# Patient Record
Sex: MALE | Race: WHITE | NOT HISPANIC OR LATINO | Employment: FULL TIME | URBAN - METROPOLITAN AREA
[De-identification: names, ages, dates, MRNs, and addresses within clinical notes are randomized per-mention and may not be internally consistent; named-entity substitution may affect disease eponyms.]

---

## 2020-12-15 ENCOUNTER — TELEPHONE (OUTPATIENT)
Dept: SCHEDULING | Facility: IMAGING CENTER | Age: 25
End: 2020-12-15

## 2021-01-04 ENCOUNTER — OFFICE VISIT (OUTPATIENT)
Dept: MEDICAL GROUP | Facility: MEDICAL CENTER | Age: 26
End: 2021-01-04
Payer: COMMERCIAL

## 2021-01-04 VITALS
SYSTOLIC BLOOD PRESSURE: 134 MMHG | HEART RATE: 72 BPM | DIASTOLIC BLOOD PRESSURE: 88 MMHG | OXYGEN SATURATION: 99 % | TEMPERATURE: 98.1 F | WEIGHT: 187.39 LBS | BODY MASS INDEX: 28.4 KG/M2 | HEIGHT: 68 IN | RESPIRATION RATE: 16 BRPM

## 2021-01-04 DIAGNOSIS — Z13.29 THYROID DISORDER SCREEN: ICD-10-CM

## 2021-01-04 DIAGNOSIS — L05.91 PILONIDAL CYST: ICD-10-CM

## 2021-01-04 DIAGNOSIS — Z23 NEED FOR VACCINATION: ICD-10-CM

## 2021-01-04 DIAGNOSIS — Z13.21 ENCOUNTER FOR VITAMIN DEFICIENCY SCREENING: ICD-10-CM

## 2021-01-04 DIAGNOSIS — F51.01 PRIMARY INSOMNIA: ICD-10-CM

## 2021-01-04 DIAGNOSIS — Z86.16 HISTORY OF COVID-19: ICD-10-CM

## 2021-01-04 DIAGNOSIS — Z00.00 ANNUAL PHYSICAL EXAM: ICD-10-CM

## 2021-01-04 DIAGNOSIS — Z13.220 LIPID SCREENING: ICD-10-CM

## 2021-01-04 PROCEDURE — 90632 HEPA VACCINE ADULT IM: CPT | Performed by: NURSE PRACTITIONER

## 2021-01-04 PROCEDURE — 90471 IMMUNIZATION ADMIN: CPT | Performed by: NURSE PRACTITIONER

## 2021-01-04 PROCEDURE — 99385 PREV VISIT NEW AGE 18-39: CPT | Mod: 25 | Performed by: NURSE PRACTITIONER

## 2021-01-04 PROCEDURE — 90686 IIV4 VACC NO PRSV 0.5 ML IM: CPT | Performed by: NURSE PRACTITIONER

## 2021-01-04 PROCEDURE — 90472 IMMUNIZATION ADMIN EACH ADD: CPT | Performed by: NURSE PRACTITIONER

## 2021-01-04 PROCEDURE — 90715 TDAP VACCINE 7 YRS/> IM: CPT | Performed by: NURSE PRACTITIONER

## 2021-01-04 PROCEDURE — 99213 OFFICE O/P EST LOW 20 MIN: CPT | Mod: 25 | Performed by: NURSE PRACTITIONER

## 2021-01-04 NOTE — ASSESSMENT & PLAN NOTE
States that he has had sleep difficulty dating back to childhood, both problems with sleep induction as well as sleep maintenance.  He used to struggle more with anxiety which may have been a factor but states that this is been better in recent years.  He is exercising regularly but does not notice that this necessarily helps with his sleep.  He prefers not to take any medication but had tried an over-the-counter melatonin without much success  No feelings of depression, no panic attack

## 2021-01-04 NOTE — ASSESSMENT & PLAN NOTE
States that he developed a headache about 2 weeks ago which was very unusual for him-he never tends to get headaches.  He was tested for COVID-19 and came back positive.  Symptoms lasted for a few days and then have since resolved.  He never did develop cough, fever, shortness of breath.  He has had a follow-up test which was negative.

## 2021-01-04 NOTE — PROGRESS NOTES
Chief Complaint   Patient presents with   • Establish Care     NP   • Requesting Labs   • Bump     on tailbone, pt states getting bigger, pain when sitting     Joao Ulrich is a 25 y.o. male here to establish care and for well exam  HPI:   Primary insomnia  States that he has had sleep difficulty dating back to childhood, both problems with sleep induction as well as sleep maintenance.  He used to struggle more with anxiety which may have been a factor but states that this is been better in recent years.  He is exercising regularly but does not notice that this necessarily helps with his sleep.  He prefers not to take any medication but had tried an over-the-counter melatonin without much success  No feelings of depression, no panic attack    Pilonidal cyst  He reports small painful lump on the tailbone which has been present for approximately 4 months.  It seems to flareup for a few days at a time and then will subside.  It is causing him difficulty with exercise, pain when lying on his back to do sit ups or yoga.  He is not had any recent drainage, and is not inflamed at this time    History of COVID-19  States that he developed a headache about 2 weeks ago which was very unusual for him-he never tends to get headaches.  He was tested for COVID-19 and came back positive.  Symptoms lasted for a few days and then have since resolved.  He never did develop cough, fever, shortness of breath.  He has had a follow-up test which was negative.    Current medicines (including changes today)  No current outpatient medications on file.     No current facility-administered medications for this visit.      He  has no past medical history on file.  He  has a past surgical history that includes open reduction.  Social History     Tobacco Use   • Smoking status: Never Smoker   • Smokeless tobacco: Never Used   Substance Use Topics   • Alcohol use: Yes     Comment: occ   • Drug use: No     Social History     Social  "History Narrative   • Not on file     Family History   Problem Relation Age of Onset   • Diabetes Mother    • Diabetes Maternal Grandmother    • Parkinson's Disease Maternal Grandmother      Family Status   Relation Name Status   • Mo  Alive   • Fa  Alive   • MGMo           ROS  Problems listed discussed above, all other systems reviewed and negative     Objective:     /88 (BP Location: Left arm, Patient Position: Sitting, BP Cuff Size: Adult)   Pulse 72   Temp 36.7 °C (98.1 °F) (Temporal)   Resp 16   Ht 1.727 m (5' 8\")   Wt 85 kg (187 lb 6.3 oz)   SpO2 99%  Body mass index is 28.49 kg/m².  Physical Exam:  General: Alert, oriented in no acute distress.  Eye contact is good, speech is normal, affect calm  HEENT:  TMs gray with good landmarks bilaterally. No cervical or supraclavicular lymphadenopathy, thyroid isthmus palpable without masses or nodules.  Lungs: clear to auscultation bilaterally, good aeration, normal effort. No wheeze/ rhonchi/ rales.  CV: regular rate and rhythm, S1, S2. No murmur, no JVD, no edema. Pedal pulses 2 + bilaterally  Abdomen: soft, nontender, BS x4  Ext: Approximately 2 cm firm palpable cyst at the gluteal cleft, no significant erythema or drainage, tunneling present.  Color normal, vascularity normal, temperature normal. No rash or lesions.    Assessment and Plan:   The following treatment plan was discussed   1. Annual physical exam  Normal physical exam except as detailed below, concerns addressed. General health and wellness discussion including healthy diet, regular exercise. 2000 iu Vit d3 advised daily. Preventative health screenings as listed. Advised regular dental cleanings, eye exam yearly.    Lipid Profile    Comp Metabolic Panel    TSH WITH REFLEX TO FT4   2. Lipid screening  Lipid Profile   3. Thyroid disorder screen  TSH WITH REFLEX TO FT4   4. Encounter for vitamin deficiency screening  VITAMIN D,25 HYDROXY   5. Pilonidal cyst  REFERRAL TO GENERAL " SURGERY   6. Need for vaccination  I have placed the below orders and discussed them with an approved delegating provider. The MA is performing the below orders under the direction of Dr. Newman  Influenza Vaccine Quad Injection (PF)    Tdap =>6yo IM    Hep A Adult 19+   7. Primary insomnia   this is been a chronic issue, he is reluctant to try any medication.  Sleep hygiene reviewed.  Discussed safe over-the-counter options-should he choose he may try Unisom or ZzzQuil or higher dose of melatonin.   8. History of COVID-19   fully recovered at this time         Followup: Pending labs             Please note that this dictation was created using voice recognition software. I have worked with consultants from the vendor as well as technical experts from [employer] to optimize the interface. I have made every reasonable attempt to correct obvious errors, but I expect that there are errors of grammar and possibly content that I did not discover before finalizing the note.

## 2021-01-04 NOTE — ASSESSMENT & PLAN NOTE
He reports small painful lump on the tailbone which has been present for approximately 4 months.  It seems to flareup for a few days at a time and then will subside.  It is causing him difficulty with exercise, pain when lying on his back to do sit ups or yoga.  He is not had any recent drainage, and is not inflamed at this time

## 2021-01-07 ENCOUNTER — HOSPITAL ENCOUNTER (OUTPATIENT)
Dept: LAB | Facility: MEDICAL CENTER | Age: 26
End: 2021-01-07
Attending: NURSE PRACTITIONER
Payer: COMMERCIAL

## 2021-01-07 DIAGNOSIS — Z13.21 ENCOUNTER FOR VITAMIN DEFICIENCY SCREENING: ICD-10-CM

## 2021-01-07 DIAGNOSIS — Z00.00 ANNUAL PHYSICAL EXAM: ICD-10-CM

## 2021-01-07 DIAGNOSIS — Z13.29 THYROID DISORDER SCREEN: ICD-10-CM

## 2021-01-07 DIAGNOSIS — Z13.220 LIPID SCREENING: ICD-10-CM

## 2021-01-07 LAB
25(OH)D3 SERPL-MCNC: 21 NG/ML (ref 30–100)
ALBUMIN SERPL BCP-MCNC: 4.7 G/DL (ref 3.2–4.9)
ALBUMIN/GLOB SERPL: 2 G/DL
ALP SERPL-CCNC: 78 U/L (ref 30–99)
ALT SERPL-CCNC: 49 U/L (ref 2–50)
ANION GAP SERPL CALC-SCNC: 10 MMOL/L (ref 7–16)
AST SERPL-CCNC: 23 U/L (ref 12–45)
BILIRUB SERPL-MCNC: 0.6 MG/DL (ref 0.1–1.5)
BUN SERPL-MCNC: 14 MG/DL (ref 8–22)
CALCIUM SERPL-MCNC: 9.6 MG/DL (ref 8.4–10.2)
CHLORIDE SERPL-SCNC: 100 MMOL/L (ref 96–112)
CHOLEST SERPL-MCNC: 202 MG/DL (ref 100–199)
CO2 SERPL-SCNC: 26 MMOL/L (ref 20–33)
CREAT SERPL-MCNC: 0.98 MG/DL (ref 0.5–1.4)
FASTING STATUS PATIENT QL REPORTED: NORMAL
GLOBULIN SER CALC-MCNC: 2.3 G/DL (ref 1.9–3.5)
GLUCOSE SERPL-MCNC: 100 MG/DL (ref 65–99)
HDLC SERPL-MCNC: 47 MG/DL
LDLC SERPL CALC-MCNC: 138 MG/DL
POTASSIUM SERPL-SCNC: 4.3 MMOL/L (ref 3.6–5.5)
PROT SERPL-MCNC: 7 G/DL (ref 6–8.2)
SODIUM SERPL-SCNC: 136 MMOL/L (ref 135–145)
TRIGL SERPL-MCNC: 85 MG/DL (ref 0–149)
TSH SERPL DL<=0.005 MIU/L-ACNC: 1.69 UIU/ML (ref 0.38–5.33)

## 2021-01-07 PROCEDURE — 80061 LIPID PANEL: CPT

## 2021-01-07 PROCEDURE — 84443 ASSAY THYROID STIM HORMONE: CPT

## 2021-01-07 PROCEDURE — 36415 COLL VENOUS BLD VENIPUNCTURE: CPT

## 2021-01-07 PROCEDURE — 82306 VITAMIN D 25 HYDROXY: CPT

## 2021-01-07 PROCEDURE — 80053 COMPREHEN METABOLIC PANEL: CPT

## 2021-02-08 ENCOUNTER — HOSPITAL ENCOUNTER (OUTPATIENT)
Facility: MEDICAL CENTER | Age: 26
End: 2021-02-08
Attending: FAMILY MEDICINE
Payer: COMMERCIAL

## 2021-02-08 ENCOUNTER — OFFICE VISIT (OUTPATIENT)
Dept: URGENT CARE | Facility: CLINIC | Age: 26
End: 2021-02-08
Payer: COMMERCIAL

## 2021-02-08 VITALS
DIASTOLIC BLOOD PRESSURE: 84 MMHG | HEIGHT: 68 IN | SYSTOLIC BLOOD PRESSURE: 120 MMHG | OXYGEN SATURATION: 97 % | WEIGHT: 185.2 LBS | BODY MASS INDEX: 28.07 KG/M2 | HEART RATE: 72 BPM | TEMPERATURE: 98.5 F | RESPIRATION RATE: 18 BRPM

## 2021-02-08 DIAGNOSIS — N50.811 RIGHT TESTICULAR PAIN: ICD-10-CM

## 2021-02-08 DIAGNOSIS — R10.31 RIGHT GROIN PAIN: ICD-10-CM

## 2021-02-08 LAB
APPEARANCE UR: CLEAR
BILIRUB UR STRIP-MCNC: NEGATIVE MG/DL
COLOR UR AUTO: YELLOW
GLUCOSE UR STRIP.AUTO-MCNC: NEGATIVE MG/DL
KETONES UR STRIP.AUTO-MCNC: NEGATIVE MG/DL
LEUKOCYTE ESTERASE UR QL STRIP.AUTO: NEGATIVE
NITRITE UR QL STRIP.AUTO: NEGATIVE
PH UR STRIP.AUTO: 7 [PH] (ref 5–8)
PROT UR QL STRIP: NEGATIVE MG/DL
RBC UR QL AUTO: NEGATIVE
SP GR UR STRIP.AUTO: >=1.03
UROBILINOGEN UR STRIP-MCNC: 0.2 MG/DL

## 2021-02-08 PROCEDURE — 87591 N.GONORRHOEAE DNA AMP PROB: CPT

## 2021-02-08 PROCEDURE — 87491 CHLMYD TRACH DNA AMP PROBE: CPT

## 2021-02-08 PROCEDURE — 81002 URINALYSIS NONAUTO W/O SCOPE: CPT | Performed by: FAMILY MEDICINE

## 2021-02-08 PROCEDURE — 99213 OFFICE O/P EST LOW 20 MIN: CPT | Performed by: FAMILY MEDICINE

## 2021-02-08 ASSESSMENT — ENCOUNTER SYMPTOMS
DIZZINESS: 0
FEVER: 0
MYALGIAS: 0
AFFECTED TESTICLE: 1
CHILLS: 0
COUGH: 0
NAUSEA: 0
SORE THROAT: 0
SHORTNESS OF BREATH: 0
VOMITING: 0

## 2021-02-08 ASSESSMENT — PAIN SCALES - GENERAL: PAINLEVEL: 3=SLIGHT PAIN

## 2021-02-09 ENCOUNTER — TELEPHONE (OUTPATIENT)
Dept: URGENT CARE | Facility: PHYSICIAN GROUP | Age: 26
End: 2021-02-09

## 2021-02-09 ENCOUNTER — HOSPITAL ENCOUNTER (OUTPATIENT)
Dept: RADIOLOGY | Facility: MEDICAL CENTER | Age: 26
End: 2021-02-09
Attending: FAMILY MEDICINE
Payer: COMMERCIAL

## 2021-02-09 DIAGNOSIS — N50.811 RIGHT TESTICULAR PAIN: ICD-10-CM

## 2021-02-09 DIAGNOSIS — R10.31 RIGHT GROIN PAIN: ICD-10-CM

## 2021-02-09 LAB
C TRACH DNA SPEC QL NAA+PROBE: NEGATIVE
N GONORRHOEA DNA SPEC QL NAA+PROBE: NEGATIVE
SPECIMEN SOURCE: NORMAL

## 2021-02-09 PROCEDURE — 76870 US EXAM SCROTUM: CPT

## 2021-02-09 NOTE — PATIENT INSTRUCTIONS
Epididymitis    Epididymitis is swelling (inflammation) or infection of the epididymis. The epididymis is a cord-like structure that is located along the top and back part of the testicle. It collects and stores sperm from the testicle.  This condition can also cause pain and swelling of the testicle and scrotum. Symptoms usually start suddenly (acute epididymitis). Sometimes epididymitis starts gradually and lasts for a while (chronic epididymitis). This type may be harder to treat.  What are the causes?  In men ages 20-40, this condition is usually caused by a bacterial infection or a sexually transmitted disease (STD), such as:  · Gonorrhea.  · Chlamydia.  In men 40 and older who do not have anal sex, this condition is usually caused by bacteria from a blockage or from abnormalities in the urinary system. These can result from:  · Having a tube placed into the bladder (urinary catheter).  · Having an enlarged or inflamed prostate gland.  · Having recently had urinary tract surgery.  · Having a problem with a backward flow of urine (retrograde).  In men who have a condition that weakens the body's defense system (immune system), such as HIV, this condition can be caused by:  · Other bacteria, including tuberculosis and syphilis.  · Viruses.  · Fungi.  Sometimes this condition occurs without infection. This may happen because of trauma or repetitive activities such as sports.  What increases the risk?  You are more likely to develop this condition if you have:  · Unprotected sex with more than one partner.  · Anal sex.  · Recently had surgery.  · A urinary catheter.  · Urinary problems.  · A suppressed immune system.  What are the signs or symptoms?  This condition usually begins suddenly with chills, fever, and pain behind the scrotum and in the testicle. Other symptoms include:  · Swelling of the scrotum, testicle, or both.  · Pain when ejaculating or urinating.  · Pain in the back or  abdomen.  · Nausea.  · Itching and discharge from the penis.  · A frequent need to pass urine.  · Redness, increased warmth, and tenderness of the scrotum.  How is this diagnosed?  Your health care provider can diagnose this condition based on your symptoms and medical history. Your health care provider will also do a physical exam to ask about your symptoms and check your scrotum and testicle for swelling, pain, and redness. You may also have other tests, including:  · Examination of discharge from the penis.  · Urine tests for infections, such as STDs.  · Ultrasound test for blood flow and inflammation.  Your health care provider may test you for other STDs, including HIV.  How is this treated?  Treatment for this condition depends on the cause. If your condition is caused by a bacterial infection, oral antibiotic medicine may be prescribed. If the bacterial infection has spread to your blood, you may need to receive IV antibiotics.  For both bacterial and nonbacterial epididymitis, you may be treated with:  · Rest.  · Elevation of the scrotum.  · Pain medicines.  · Anti-inflammatory medicines.  Surgery may be needed to treat:  · Bacterial epididymitis that causes pus to build up in the scrotum (abscess).  · Chronic epididymitis that has not responded to other treatments.  Follow these instructions at home:  Medicines  · Take over-the-counter and prescription medicines only as told by your health care provider.  · If you were prescribed an antibiotic medicine, take it as told by your health care provider. Do not stop taking the antibiotic even if your condition improves.  Sexual activity  · If your epididymitis was caused by an STD, avoid sexual activity until your treatment is complete.  · Inform your sexual partner or partners if you test positive for an STD. They may need to be treated. Do not engage in sexual activity with your partner or partners until their treatment is completed.  Managing pain and  swelling    · If directed, elevate your scrotum and apply ice.  ? Put ice in a plastic bag.  ? Place a small towel or pillow between your legs.  ? Rest your scrotum on the pillow or towel.  ? Place another towel between your skin and the plastic bag.  ? Leave the ice on for 20 minutes, 2-3 times a day.  · Try taking a sitz bath to help with discomfort. This is a warm water bath that is taken while you are sitting down. The water should only come up to your hips and should cover your buttocks. Do this 3-4 times per day or as told by your health care provider.  · Keep your scrotum elevated and supported while resting. Ask your health care provider if you should wear a scrotal support, such as a jockstrap. Wear it as told by your health care provider.  General instructions  · Return to your normal activities as told by your health care provider. Ask your health care provider what activities are safe for you.  · Drink enough fluid to keep your urine pale yellow.  · Keep all follow-up visits as told by your health care provider. This is important.  Contact a health care provider if:  · You have a fever.  · Your pain medicine is not helping.  · Your pain is getting worse.  · Your symptoms do not improve within 3 days.  Summary  · Epididymitis is swelling (inflammation) or infection of the epididymis. This condition can also cause pain and swelling of the testicle and scrotum.  · Treatment for this condition depends on the cause. If your condition is caused by a bacterial infection, oral antibiotic medicine may be prescribed.  · Inform your sexual partner or partners if you test positive for an STD. They may need to be treated. Do not engage in sexual activity with your partner or partners until their treatment is completed.  · Contact a health care provider if your symptoms do not improve within 3 days.  This information is not intended to replace advice given to you by your health care provider. Make sure you discuss any  questions you have with your health care provider.  Document Released: 12/15/2001 Document Revised: 10/21/2019 Document Reviewed: 10/22/2019  [employer] Patient Education © 2020 [employer]  Varicocele    A varicocele is a swelling of veins in the scrotum. The scrotum is the sac that contains the testicles. Varicoceles can occur on either side of the scrotum, but they are more common on the left side. They occur most often in teenage boys and young men.  In most cases, varicoceles are not a serious problem. They are usually small and painless and do not require treatment. Tests may be done to confirm the diagnosis. Treatment may be needed if:  · A varicocele is large, causes a lot of pain, or causes pain when exercising.  · Varicoceles are found on both sides of the scrotum.  · A varicocele causes a decrease in the size of the testicle in a growing adolescent.  · The person has fertility problems.  What are the causes?  This condition is the result of valves in the veins not working properly. Valves in the veins help to return blood from the scrotum and testicles to the heart. If these valves do not work well, blood flows backward and backs up into the veins, which causes the veins to swell. This is similar to what happens when varicose veins form in the leg.  What are the signs or symptoms?  Most varicoceles do not cause any symptoms. If symptoms do occur, they may include:  · Swelling on one side of the scrotum. The swelling may be more obvious when you are standing up.  · A lumpy feeling in the scrotum.  · A heavy feeling on one side of the scrotum.  · A dull ache in the scrotum, especially after exercise or prolonged standing or sitting.  · Slower growth or reduced size of the testicle on the side of the varicocele (in young males).  · Problems with fathering a child (fertility). This can occur if the testicle does not grow normally or if the condition causes problems with the sperm, such as a low sperm count or  sperm that are not able to reach the egg (poor motility).  How is this diagnosed?  This condition is diagnosed based on:  · Your medical history.  · A physical exam. Your health care provider may inspect and feel (palpate) the scrotal area to check for swollen or enlarged veins.  · An ultrasound. This may be done to confirm the diagnosis and to help rule out other causes of the swelling.  How is this treated?  Treatment is usually not needed for this condition. If you have any pain, your health care provider may prescribe or recommend medicine to help relieve it. You may need regular exams so your health care provider can monitor the varicocele to ensure that it does not cause problems.  When further treatment is needed, it may involve one of these options:  · Varicocelectomy. This is a surgery in which the swollen veins are tied off so that the flow of blood goes to other veins instead.  · Embolization. In this procedure, a small, thin tube (catheter) is used to place metal coils or other blocking items in the veins. This cuts off the blood flow to the swollen veins.  Follow these instructions at home:  · Take over-the-counter and prescription medicines only as told by your health care provider.  · Wear supportive underwear.  · Use an athletic supporter when participating in sports activities.  · Keep all follow-up visits as told by your health care provider. This is important.  Contact a health care provider if:  · Your pain is increasing.  · You have redness in the affected area.  · Your testicle becomes enlarged, swollen, or painful.  · You have swelling that does not decrease when you are lying down.  · One of your testicles is smaller than the other.  Get help right away if:  · You develop swelling in your legs.  · You have difficulty breathing.  Summary  · Varicocele is a condition in which the veins in the scrotum are swollen or enlarged.  · In most cases, varicoceles do not require treatment.  · Treatment  may be needed if you have pain, have problems with infertility, or have a smaller testicle associated with the varicocele.  · In some cases, the condition may be treated with a procedure to cut off the flow of blood to the swollen veins.  This information is not intended to replace advice given to you by your health care provider. Make sure you discuss any questions you have with your health care provider.  Document Released: 03/26/2002 Document Revised: 03/06/2020 Document Reviewed: 03/06/2020  Elsevier Patient Education © 2020 [employer]

## 2021-02-09 NOTE — PROGRESS NOTES
"Subjective:   Joao Ulrich is a 25 y.o. male who presents for Testicle Pain (right x1week)        Testicle Pain  This is a new problem. Episode onset: 1 week, gradual onset right groin and testicle pain, described as right testicle \"fullness\" worse this morning. The problem has been waxing and waning (worse this morning, denies pain at this time). Pertinent negatives include no chills, coughing, fever, myalgias, nausea, rash, sore throat or vomiting. Associated symptoms comments: Pain worse with walking, resolves at rest    Reports unprotected intercourse with single partner within lat 6 months, denies penile discharge, denies dysuria    Denies traumatic injury or recent increase in activity. The symptoms are aggravated by walking. He has tried rest for the symptoms. The treatment provided mild relief.     PMH:  has no past medical history on file.  MEDS: No current outpatient medications on file.  ALLERGIES: No Known Allergies  SURGHX:   Past Surgical History:   Procedure Laterality Date   • OPEN REDUCTION      L tibia repair     SOCHX:  reports that he has never smoked. He has never used smokeless tobacco. He reports current alcohol use. He reports that he does not use drugs.  FH:   Family History   Problem Relation Age of Onset   • Diabetes Mother    • Diabetes Maternal Grandmother    • Parkinson's Disease Maternal Grandmother      Review of Systems   Constitutional: Negative for chills and fever.   HENT: Negative for sore throat.    Respiratory: Negative for cough and shortness of breath.    Gastrointestinal: Negative for nausea and vomiting.   Genitourinary: Negative for dysuria, frequency and hematuria.   Musculoskeletal: Negative for myalgias.   Skin: Negative for rash.   Neurological: Negative for dizziness.        Objective:   /84 (BP Location: Left arm, Patient Position: Sitting)   Pulse 72   Temp 36.9 °C (98.5 °F) (Tympanic)   Resp 18   Ht 1.727 m (5' 8\")   Wt 84 kg (185 lb 3.2 oz)  "  SpO2 97%   BMI 28.16 kg/m²   Physical Exam  Vitals signs and nursing note reviewed.   Constitutional:       General: He is not in acute distress.     Appearance: He is well-developed.   HENT:      Head: Normocephalic and atraumatic.      Right Ear: External ear normal.      Left Ear: External ear normal.      Nose: Nose normal.      Mouth/Throat:      Mouth: Mucous membranes are moist.   Eyes:      Conjunctiva/sclera: Conjunctivae normal.   Cardiovascular:      Rate and Rhythm: Normal rate.   Pulmonary:      Effort: Pulmonary effort is normal. No respiratory distress.      Breath sounds: Normal breath sounds.   Abdominal:      General: There is no distension.      Hernia: There is no hernia in the left inguinal area or right inguinal area.   Genitourinary:     Testes:         Right: Swelling and varicocele present. Tenderness or testicular hydrocele not present.         Left: Tenderness, swelling, testicular hydrocele or varicocele not present.      Epididymis:      Right: Enlarged. Not inflamed. No mass or tenderness.      Left: Not inflamed or enlarged. No mass or tenderness.   Musculoskeletal: Normal range of motion.   Skin:     General: Skin is warm and dry.   Neurological:      General: No focal deficit present.      Mental Status: He is alert and oriented to person, place, and time. Mental status is at baseline.      Gait: Gait (gait at baseline) normal.   Psychiatric:         Judgment: Judgment normal.     [id number]  Order: [id number]  Status:  Final result   Visible to patient:  No (not released) Dx:  Right groin pain; Right testicular pain  Details    Reading Physician Reading Date Result Priority   Nathaniel Cardozo M.D.  [phone] 2/9/2021 STAT      Narrative & Impression        2/9/2021 4:33 PM     HISTORY/REASON FOR EXAM: Right-sided testicular pain.     TECHNIQUE/EXAM DESCRIPTION:  Real-time sonography of the scrotum was performed with gray-scale, color and duplex Doppler  imaging.     COMPARISON: None     FINDINGS:  The testes are homogeneous in echotexture and low-resistive waveforms are confirmed bilaterally. No intratesticular mass is seen. Vascular flow is symmetric.     The right testis measures 5.03 cm x 2.15 cm x 3.27 cm.  The left testis measures  4.81 cm x 2.16 cm x 3.19 cm.     Appearance of the epididymides are within normal limits.  Several small 4 and 5 mm simple cysts are present in the right epididymal head.     No abnormal volume hydrocele is detected.     No varicocele is detected.  There is a small reducible indirect left inguinal hernia present with the hernia sac measuring 1.4 cm in maximum length. No bowel is identified within the hernia.     IMPRESSION:     1.  No testicular mass or evidence of testicular torsion.     2.  No evidence of epididymitis.     3.  Small left indirect reducible inguinal hernia containing fat is incidentally noted.             Last Resulted: 02/09/21  5:39 PM                 Assessment/Plan:   1. Right testicular pain  - POCT Urinalysis  - CHLAMYDIA/GC PCR URINE OR SWAB; Future  - [id number]; Future    2. Right groin pain  - [id number]; Future        Medical Decision Making/Course:  In the course of preparing for this visit with review of the pertinent past medical history, recent and past clinic visits, current medications, and in the further course of obtaining the current history pertinent to the clinic visit today, performing an exam and evaluation, ordering and independently evaluating labs, tests, and/or procedures, prescribing any recommended new medications including a discussion on the patient request to avoid initiation of pain medication at this time as the right testicular pain is not limiting function nor particularly bothersome during the urgent care visit, and providing any pertinent counseling and education and recommending further coordination of care, at least 30 minutes of total time were spent  during this encounter.      Discussed close monitoring, return precautions, and supportive measures of maintaining adequate fluid hydration and caloric intake, relative rest and symptom management as needed for pain and/or fever.    Differential diagnosis, natural history, supportive care, and indications for immediate follow-up discussed.     Advised the patient to follow-up with the primary care physician for recheck, reevaluation, and consideration of further management.    Please note that this dictation was created using voice recognition software. I have worked with consultants from the vendor as well as technical experts from [employer] to optimize the interface. I have made every reasonable attempt to correct obvious errors, but I expect that there are errors of grammar and possibly content that I did not discover before finalizing the note.

## 2021-02-10 NOTE — RESULT ENCOUNTER NOTE
Please call patient and let them know the laboratory testing for chlamydia and gonorrhea were both negative.  Thank you

## 2021-02-10 NOTE — TELEPHONE ENCOUNTER
Called patient and after identification confirmation gave results of normal scrotal and testicular ultrasound.  Advised over-the-counter ibuprofen every 8 hours as needed for pain and follow-up with primary care provider for any persistent or worsening symptoms for consideration of urology consultation and/or return to the urgent care for any persistent or worsening symptoms for consideration of further evaluation management.

## 2021-03-27 ENCOUNTER — APPOINTMENT (OUTPATIENT)
Dept: RADIOLOGY | Facility: MEDICAL CENTER | Age: 26
End: 2021-03-27
Attending: EMERGENCY MEDICINE
Payer: COMMERCIAL

## 2021-03-27 ENCOUNTER — HOSPITAL ENCOUNTER (EMERGENCY)
Facility: MEDICAL CENTER | Age: 26
End: 2021-03-27
Attending: EMERGENCY MEDICINE
Payer: COMMERCIAL

## 2021-03-27 VITALS
TEMPERATURE: 98.1 F | SYSTOLIC BLOOD PRESSURE: 116 MMHG | DIASTOLIC BLOOD PRESSURE: 75 MMHG | RESPIRATION RATE: 16 BRPM | OXYGEN SATURATION: 98 % | WEIGHT: 182.54 LBS | HEIGHT: 68 IN | HEART RATE: 61 BPM | BODY MASS INDEX: 27.67 KG/M2

## 2021-03-27 DIAGNOSIS — R10.13 EPIGASTRIC PAIN: ICD-10-CM

## 2021-03-27 LAB
ALBUMIN SERPL BCP-MCNC: 4.9 G/DL (ref 3.2–4.9)
ALBUMIN/GLOB SERPL: 2 G/DL
ALP SERPL-CCNC: 76 U/L (ref 30–99)
ALT SERPL-CCNC: 31 U/L (ref 2–50)
ANION GAP SERPL CALC-SCNC: 11 MMOL/L (ref 7–16)
APPEARANCE UR: ABNORMAL
AST SERPL-CCNC: 17 U/L (ref 12–45)
BACTERIA #/AREA URNS HPF: NEGATIVE /HPF
BASOPHILS # BLD AUTO: 0.5 % (ref 0–1.8)
BASOPHILS # BLD: 0.04 K/UL (ref 0–0.12)
BILIRUB SERPL-MCNC: 0.6 MG/DL (ref 0.1–1.5)
BILIRUB UR QL STRIP.AUTO: NEGATIVE
BUN SERPL-MCNC: 12 MG/DL (ref 8–22)
CALCIUM SERPL-MCNC: 9.3 MG/DL (ref 8.5–10.5)
CHLORIDE SERPL-SCNC: 102 MMOL/L (ref 96–112)
CO2 SERPL-SCNC: 24 MMOL/L (ref 20–33)
COLOR UR: YELLOW
CREAT SERPL-MCNC: 0.8 MG/DL (ref 0.5–1.4)
EOSINOPHIL # BLD AUTO: 0.76 K/UL (ref 0–0.51)
EOSINOPHIL NFR BLD: 10.1 % (ref 0–6.9)
EPI CELLS #/AREA URNS HPF: NEGATIVE /HPF
ERYTHROCYTE [DISTWIDTH] IN BLOOD BY AUTOMATED COUNT: 37.3 FL (ref 35.9–50)
GLOBULIN SER CALC-MCNC: 2.5 G/DL (ref 1.9–3.5)
GLUCOSE SERPL-MCNC: 92 MG/DL (ref 65–99)
GLUCOSE UR STRIP.AUTO-MCNC: NEGATIVE MG/DL
HCT VFR BLD AUTO: 44.4 % (ref 42–52)
HGB BLD-MCNC: 15.5 G/DL (ref 14–18)
HYALINE CASTS #/AREA URNS LPF: ABNORMAL /LPF
IMM GRANULOCYTES # BLD AUTO: 0.02 K/UL (ref 0–0.11)
IMM GRANULOCYTES NFR BLD AUTO: 0.3 % (ref 0–0.9)
KETONES UR STRIP.AUTO-MCNC: NEGATIVE MG/DL
LEUKOCYTE ESTERASE UR QL STRIP.AUTO: NEGATIVE
LIPASE SERPL-CCNC: 31 U/L (ref 11–82)
LYMPHOCYTES # BLD AUTO: 2.92 K/UL (ref 1–4.8)
LYMPHOCYTES NFR BLD: 38.6 % (ref 22–41)
MCH RBC QN AUTO: 29.4 PG (ref 27–33)
MCHC RBC AUTO-ENTMCNC: 34.9 G/DL (ref 33.7–35.3)
MCV RBC AUTO: 84.3 FL (ref 81.4–97.8)
MICRO URNS: ABNORMAL
MONOCYTES # BLD AUTO: 0.45 K/UL (ref 0–0.85)
MONOCYTES NFR BLD AUTO: 6 % (ref 0–13.4)
NEUTROPHILS # BLD AUTO: 3.37 K/UL (ref 1.82–7.42)
NEUTROPHILS NFR BLD: 44.5 % (ref 44–72)
NITRITE UR QL STRIP.AUTO: NEGATIVE
NRBC # BLD AUTO: 0 K/UL
NRBC BLD-RTO: 0 /100 WBC
PH UR STRIP.AUTO: 7 [PH] (ref 5–8)
PLATELET # BLD AUTO: 246 K/UL (ref 164–446)
PMV BLD AUTO: 10.3 FL (ref 9–12.9)
POTASSIUM SERPL-SCNC: 4 MMOL/L (ref 3.6–5.5)
PROT SERPL-MCNC: 7.4 G/DL (ref 6–8.2)
PROT UR QL STRIP: NEGATIVE MG/DL
RBC # BLD AUTO: 5.27 M/UL (ref 4.7–6.1)
RBC # URNS HPF: ABNORMAL /HPF
RBC UR QL AUTO: NEGATIVE
SODIUM SERPL-SCNC: 137 MMOL/L (ref 135–145)
SP GR UR STRIP.AUTO: 1.02
UROBILINOGEN UR STRIP.AUTO-MCNC: 0.2 MG/DL
WBC # BLD AUTO: 7.6 K/UL (ref 4.8–10.8)
WBC #/AREA URNS HPF: ABNORMAL /HPF

## 2021-03-27 PROCEDURE — 36415 COLL VENOUS BLD VENIPUNCTURE: CPT

## 2021-03-27 PROCEDURE — 83690 ASSAY OF LIPASE: CPT

## 2021-03-27 PROCEDURE — 80053 COMPREHEN METABOLIC PANEL: CPT

## 2021-03-27 PROCEDURE — 76705 ECHO EXAM OF ABDOMEN: CPT

## 2021-03-27 PROCEDURE — 81001 URINALYSIS AUTO W/SCOPE: CPT

## 2021-03-27 PROCEDURE — 85025 COMPLETE CBC W/AUTO DIFF WBC: CPT

## 2021-03-27 PROCEDURE — 99284 EMERGENCY DEPT VISIT MOD MDM: CPT

## 2021-03-27 NOTE — ED PROVIDER NOTES
ED Provider Note    Scribed for Deborah Munoz M.D. by Olga Lombardo. 3/27/2021  4:24 PM    Primary care provider: KAYLAN Calvillo  Means of arrival: Walk in  History obtained from: Patient  History limited by: None    CHIEF COMPLAINT  Chief Complaint   Patient presents with    Abdominal Pain       HPI  Joao Ulrich is a 25 y.o. male, with no significant medical history, who presents to the Emergency Department accompanied by his significant other, for intermittent right upper quadrant abdominal pain with an onset of 2 days ago. The patient describes his pain as tightness and inflammation in quality. Pain would come in waves of several seconds before resolving on its own. His pain initially started in his right side, and is now radiating to his right flank intermittently. At its worse, his abdominal pain is rated a 6-7/10. The patient notes he was participating in some yoga yesterday and adds he may have pulled something. He states the onset of his symptoms are random, and does not come on with eating, ambulation, or any particular activity. He also denies any recent changes to his diet. He was seen and evaluated at urgent care earlier today and was instructed to come to the ED for further evaluation. He reports associated dark urine, and some nasal congestion. He denies any associated fever, chills, nausea, vomiting, constipation, diarrhea, dysuria, chest pain, shortness of breath, headache. No alleviating or exacerbating factors were identified.  He also denies any abdominal surgeries.    REVIEW OF SYSTEMS  HEENT: Congestion, no headache  CARDIAC: no chest pain    PULMONARY: no shortness of breath  GI: abdominal pain, no vomiting, diarrhea, or constipation.  : Dark colored urine. no dysuria.  Neuro: no headache  Endocrine: no fevers, or chills    See history of present illness. All other systems are negative.     PAST MEDICAL HISTORY   Denies    SURGICAL HISTORY   has a past surgical history  "that includes open reduction.    SOCIAL HISTORY  Social History     Tobacco Use    Smoking status: Never Smoker    Smokeless tobacco: Never Used   Substance Use Topics    Alcohol use: Yes     Comment: occ    Drug use: No      Social History     Substance and Sexual Activity   Drug Use No       FAMILY HISTORY  Family History   Problem Relation Age of Onset    Diabetes Mother     Diabetes Maternal Grandmother     Parkinson's Disease Maternal Grandmother      Denies GI history in family.     CURRENT MEDICATIONS  Home Medications       Reviewed by Shawn Patton R.N. (Registered Nurse) on 03/27/21 at 1558  Med List Status: Not Addressed     Medication Last Dose Status        Patient Claude Taking any Medications                           ALLERGIES  No Known Allergies    PHYSICAL EXAM  VITAL SIGNS: /81   Pulse 64   Temp 36.7 °C (98 °F) (Temporal)   Resp 16   Ht 1.727 m (5' 8\")   Wt 82.8 kg (182 lb 8.7 oz)   SpO2 98%   BMI 27.76 kg/m²     Constitutional: Well developed, Well nourished, No acute distress, Non-toxic appearance.   HEENT: Normocephalic, Atraumatic,  external ears normal, pharynx pink,  Mucous  Membranes moist, No rhinorrhea or mucosal edema  Eyes: PERRL, EOMI, Conjunctiva normal, No discharge.   Neck: Normal range of motion, No tenderness, Supple, No stridor.   Lymphatic: No lymphadenopathy    Cardiovascular: Regular Rate and Rhythm, No murmurs,  rubs, or gallops.   Thorax & Lungs: Lungs clear to auscultation bilaterally, No respiratory distress, No wheezes, rhales or rhonchi, No chest wall tenderness.   Abdomen: Bowel sounds normal, Soft, non tender, non distended,  No pulsatile masses., no rebound guarding or peritoneal signs.   Skin: Warm, Dry, No erythema, No rash,   Neurologic: Alert & oriented x 3,  No focal deficits noted.   Extremities: Equal, intact distal pulses, No cyanosis, clubbing or edema,  No tenderness.   Musculoskeletal: Good range of motion in all major joints. No tenderness " to palpation or major deformities noted.     DIAGNOSTIC STUDIES / PROCEDURES    LABS  Results for orders placed or performed during the hospital encounter of 03/27/21   CBC WITH DIFFERENTIAL   Result Value Ref Range    WBC 7.6 4.8 - 10.8 K/uL    RBC 5.27 4.70 - 6.10 M/uL    Hemoglobin 15.5 14.0 - 18.0 g/dL    Hematocrit 44.4 42.0 - 52.0 %    MCV 84.3 81.4 - 97.8 fL    MCH 29.4 27.0 - 33.0 pg    MCHC 34.9 33.7 - 35.3 g/dL    RDW 37.3 35.9 - 50.0 fL    Platelet Count 246 164 - 446 K/uL    MPV 10.3 9.0 - 12.9 fL    Neutrophils-Polys 44.50 44.00 - 72.00 %    Lymphocytes 38.60 22.00 - 41.00 %    Monocytes 6.00 0.00 - 13.40 %    Eosinophils 10.10 (H) 0.00 - 6.90 %    Basophils 0.50 0.00 - 1.80 %    Immature Granulocytes 0.30 0.00 - 0.90 %    Nucleated RBC 0.00 /100 WBC    Neutrophils (Absolute) 3.37 1.82 - 7.42 K/uL    Lymphs (Absolute) 2.92 1.00 - 4.80 K/uL    Monos (Absolute) 0.45 0.00 - 0.85 K/uL    Eos (Absolute) 0.76 (H) 0.00 - 0.51 K/uL    Baso (Absolute) 0.04 0.00 - 0.12 K/uL    Immature Granulocytes (abs) 0.02 0.00 - 0.11 K/uL    NRBC (Absolute) 0.00 K/uL   COMP METABOLIC PANEL   Result Value Ref Range    Sodium 137 135 - 145 mmol/L    Potassium 4.0 3.6 - 5.5 mmol/L    Chloride 102 96 - 112 mmol/L    Co2 24 20 - 33 mmol/L    Anion Gap 11.0 7.0 - 16.0    Glucose 92 65 - 99 mg/dL    Bun 12 8 - 22 mg/dL    Creatinine 0.80 0.50 - 1.40 mg/dL    Calcium 9.3 8.5 - 10.5 mg/dL    AST(SGOT) 17 12 - 45 U/L    ALT(SGPT) 31 2 - 50 U/L    Alkaline Phosphatase 76 30 - 99 U/L    Total Bilirubin 0.6 0.1 - 1.5 mg/dL    Albumin 4.9 3.2 - 4.9 g/dL    Total Protein 7.4 6.0 - 8.2 g/dL    Globulin 2.5 1.9 - 3.5 g/dL    A-G Ratio 2.0 g/dL   LIPASE   Result Value Ref Range    Lipase 31 11 - 82 U/L   URINALYSIS    Specimen: Blood   Result Value Ref Range    Color Yellow     Character Cloudy (A)     Specific Gravity 1.019 <1.035    Ph 7.0 5.0 - 8.0    Glucose Negative Negative mg/dL    Ketones Negative Negative mg/dL    Protein Negative  Negative mg/dL    Bilirubin Negative Negative    Urobilinogen, Urine 0.2 Negative    Nitrite Negative Negative    Leukocyte Esterase Negative Negative    Occult Blood Negative Negative    Micro Urine Req Microscopic    URINE MICROSCOPIC (W/UA)   Result Value Ref Range    WBC 0-2 (A) /hpf    RBC 0-2 (A) /hpf    Bacteria Negative None /hpf    Epithelial Cells Negative /hpf    Hyaline Cast 0-2 /lpf   ESTIMATED GFR   Result Value Ref Range    GFR If African American >60 >60 mL/min/1.73 m 2    GFR If Non African American >60 >60 mL/min/1.73 m 2     All labs reviewed by me.    RADIOLOGY  US-RUQ   Final Result      1.  No acute sonographic abnormality. No gallstones.   2.  Slightly increased hepatic echogenicity, suggestive of mild steatosis and/or hepatocellular disease.           The radiologist's interpretation of all radiological studies have been reviewed by me.    COURSE & MEDICAL DECISION MAKING  Nursing notes, VS, PMSFHx reviewed in chart.    4:24 PM - Patient seen and examined at bedside. Discussed plan of care with patient. I informed them that labs and imaging will be ordered to evaluate symptoms. Patient is understanding and agreeable with plan.  Ordered Urine microscopic, UA, Lipase, CMP, CBC with diff to evaluate his symptoms. The differential diagnoses include but are not limited to: gallstones, muscle strain, peptic ulcer disease, kidney stones.     4:41 PM - Ordered US-RUQ to evaluate.     5:43 PM - Patient was reevaluated at bedside. He is resting comfortably in bed with no new complaints at this time. Discussed lab and radiology results with the patient and informed them that they were reassuring. I advised the patient to follow up with his PCP and start a bland diet until his abdominal pain subsides. The patient will return for new or worsening symptoms and is stable at the time of discharge. He is understanding and agreeable with discharge home.     I independently evaluated the patient and repeated  the important components of the history and physical.  I discussed the management with the resident.  I have reviewed and agree with the pertinent clinical information as above including history, exam, study findings and recommendations.      Patient has had high blood pressure while in the emergency department, felt likely secondary to medical condition. Counseled patient to monitor blood pressure at home and follow up with primary care physician.    DISPOSITION:  Patient will be discharged home in stable condition.    FOLLOW UP:  Tatyana aHtch, ANEESH.P.R.N.  [address]  [address]  [facility] [id number] [phone]    Call in 2 days  for recheck    [facility], Emergency Dept  [address] [facility] [address] [phone]    As needed, If symptoms worsen    FINAL IMPRESSION  1. Epigastric pain          Olga VILLAFANA (Scribe), am scribing for, and in the presence of, Deborah Munoz M.D..    Electronically signed by: Olga Lombardo (Sairaibe), 3/27/2021    IDeborah M.D. personally performed the services described in this documentation, as scribed by Olga Lombardo in my presence, and it is both accurate and complete.    C    The note accurately reflects work and decisions made by me.  Deborah Munoz M.D.  3/27/2021  7:38 PM

## 2021-03-27 NOTE — ED TRIAGE NOTES
Amb to triage w/ c/o R sided intermittent abd pain x 2 days.  Pt sent here from  as they were unable to do an US today.  No distress noted.

## 2021-03-27 NOTE — ED NOTES
Assist RN  Pt ambulated to room w/steady gait. Changing into a hospital gown. Call light within reach. Instructed to call staff for any assistance.  Plan of care explained to pt. Kept NPO.  Labs sent. Ultrasound at bedside.

## 2021-03-28 NOTE — ED NOTES
Discharge instructions given to pt including follow up with APRN in 2 days or returning if no improvement of symptoms or to return if worse. Questions answered by RN. Denies any new complaints. Discharged w/stable vitals and able to ambulate w/steady gait.

## 2022-04-13 ENCOUNTER — OFFICE VISIT (OUTPATIENT)
Dept: URGENT CARE | Facility: CLINIC | Age: 27
End: 2022-04-13
Payer: COMMERCIAL

## 2022-04-13 VITALS
HEART RATE: 71 BPM | OXYGEN SATURATION: 97 % | DIASTOLIC BLOOD PRESSURE: 72 MMHG | SYSTOLIC BLOOD PRESSURE: 114 MMHG | WEIGHT: 178 LBS | TEMPERATURE: 97 F | BODY MASS INDEX: 26.98 KG/M2 | RESPIRATION RATE: 16 BRPM | HEIGHT: 68 IN

## 2022-04-13 DIAGNOSIS — L03.113 CELLULITIS OF RIGHT UPPER ARM: ICD-10-CM

## 2022-04-13 PROCEDURE — 99213 OFFICE O/P EST LOW 20 MIN: CPT | Performed by: FAMILY MEDICINE

## 2022-04-13 RX ORDER — CLINDAMYCIN HYDROCHLORIDE 300 MG/1
300 CAPSULE ORAL 3 TIMES DAILY
Qty: 15 CAPSULE | Refills: 0 | Status: SHIPPED | OUTPATIENT
Start: 2022-04-13 | End: 2022-04-18

## 2022-04-13 ASSESSMENT — ENCOUNTER SYMPTOMS
NAUSEA: 0
SHORTNESS OF BREATH: 0
SORE THROAT: 0
FEVER: 0
COUGH: 0
MYALGIAS: 0
CHILLS: 0
DIARRHEA: 0
VOMITING: 0
DIZZINESS: 0

## 2022-04-13 ASSESSMENT — FIBROSIS 4 INDEX: FIB4 SCORE: 0.35

## 2022-04-13 NOTE — PROGRESS NOTES
Subjective:   Joao Ulrich is a 26 y.o. male who presents for Bug Bite (Right arm ,patient was bit by a brown recluse x 5 days )        Rash  Chronicity: Reports redness right upper arm, onset after camping and suspects spider bite. Episode onset: 5 days. The problem has been gradually improving (Worse yesterday states decreasing size of erythema today) since onset. Location: Right arm. The rash is characterized by swelling and redness. He was exposed to a spider bite (Possible spider bite, with no spiders, in the vicinity of [facility]). Pertinent negatives include no cough, diarrhea, fever, shortness of breath, sore throat or vomiting. (Was able to exercise yesterday with resistance training with no functional limitation) Past treatments include antihistamine. The treatment provided mild relief.     PMH:  has no past medical history on file.  MEDS:   Current Outpatient Medications:   •  clindamycin (CLEOCIN) 300 MG Cap, Take 1 Capsule by mouth 3 times a day for 5 days., Disp: 15 Capsule, Rfl: 0  ALLERGIES:   Allergies   Allergen Reactions   • Shellfish-Derived Products Hives   • Tree Nuts Food Allergy Hives     SURGHX:   Past Surgical History:   Procedure Laterality Date   • OPEN REDUCTION      L tibia repair     SOCHX:  reports that he has never smoked. He has never used smokeless tobacco. He reports current alcohol use. He reports that he does not use drugs.  FH:   Family History   Problem Relation Age of Onset   • Diabetes Mother    • Diabetes Maternal Grandmother    • Parkinson's Disease Maternal Grandmother      Review of Systems   Constitutional: Negative for chills and fever.   HENT: Negative for sore throat.    Respiratory: Negative for cough and shortness of breath.    Gastrointestinal: Negative for diarrhea, nausea and vomiting.   Musculoskeletal: Negative for myalgias.   Skin: Positive for rash.   Neurological: Negative for dizziness.        Objective:   /72 (BP  "Location: Left arm, Patient Position: Sitting, BP Cuff Size: Adult)   Pulse 71   Temp 36.1 °C (97 °F) (Temporal)   Resp 16   Ht 1.727 m (5' 8\")   Wt 80.7 kg (178 lb)   SpO2 97%   BMI 27.06 kg/m²   Physical Exam  Vitals and nursing note reviewed.   Constitutional:       General: He is not in acute distress.     Appearance: He is well-developed.   HENT:      Head: Normocephalic and atraumatic.      Right Ear: External ear normal.      Left Ear: External ear normal.      Nose: Nose normal.      Mouth/Throat:      Mouth: Mucous membranes are moist.   Eyes:      Conjunctiva/sclera: Conjunctivae normal.   Cardiovascular:      Rate and Rhythm: Normal rate.   Pulmonary:      Effort: Pulmonary effort is normal. No respiratory distress.      Breath sounds: Normal breath sounds.   Abdominal:      General: There is no distension.   Musculoskeletal:         General: Normal range of motion.      Right shoulder: Normal range of motion.      Right upper arm: Swelling present.      Right elbow: Normal range of motion.   Skin:     General: Skin is warm and dry.          Neurological:      General: No focal deficit present.      Mental Status: He is alert and oriented to person, place, and time. Mental status is at baseline.      Gait: Gait (gait at baseline) normal.   Psychiatric:         Judgment: Judgment normal.           Assessment/Plan:   1. Cellulitis of right upper arm  - clindamycin (CLEOCIN) 300 MG Cap; Take 1 Capsule by mouth 3 times a day for 5 days.  Dispense: 15 Capsule; Refill: 0        Medical Decision Making/Course:  In the course of preparing for this visit with review of the pertinent past medical history, recent and past clinic visits, current medications, and performing chart, immunization, medical history and medication reconciliation, and in the further course of obtaining the current history pertinent to the clinic visit today, performing an exam and evaluation, ordering and independently evaluating " labs, tests  , and/or procedures, prescribing any recommended new medications as noted above, providing any pertinent counseling and education and recommending further coordination of care including recommendations for symptomatic and supportive measures, at least  11 minutes of total time were spent during this encounter.      Discussed close monitoring, return precautions, and supportive measures of maintaining adequate fluid hydration and caloric intake, relative rest and symptom management as needed for pain and/or fever.    Differential diagnosis, natural history, supportive care, and indications for immediate follow-up discussed.     Advised the patient to follow-up with the primary care physician for recheck, reevaluation, and consideration of further management.    Please note that this dictation was created using voice recognition software. I have worked with consultants from the vendor as well as technical experts from [employer] to optimize the interface. I have made every reasonable attempt to correct obvious errors, but I expect that there are errors of grammar and possibly content that I did not discover before finalizing the note.

## 2022-09-29 ENCOUNTER — TELEPHONE (OUTPATIENT)
Dept: MEDICAL GROUP | Facility: MEDICAL CENTER | Age: 27
End: 2022-09-29
Payer: COMMERCIAL

## 2023-03-16 ENCOUNTER — OFFICE VISIT (OUTPATIENT)
Dept: URGENT CARE | Facility: CLINIC | Age: 28
End: 2023-03-16
Payer: COMMERCIAL

## 2023-03-16 VITALS
WEIGHT: 170 LBS | RESPIRATION RATE: 14 BRPM | SYSTOLIC BLOOD PRESSURE: 126 MMHG | HEART RATE: 60 BPM | TEMPERATURE: 98.1 F | HEIGHT: 68 IN | OXYGEN SATURATION: 99 % | DIASTOLIC BLOOD PRESSURE: 88 MMHG | BODY MASS INDEX: 25.76 KG/M2

## 2023-03-16 DIAGNOSIS — H00.014 HORDEOLUM EXTERNUM OF LEFT UPPER EYELID: ICD-10-CM

## 2023-03-16 PROCEDURE — 99212 OFFICE O/P EST SF 10 MIN: CPT

## 2023-03-16 ASSESSMENT — FIBROSIS 4 INDEX: FIB4 SCORE: 0.36

## 2023-03-17 NOTE — PROGRESS NOTES
Chief Complaint   Patient presents with    Eye Pain     Swollen eyelid started today       HISTORY OF PRESENT ILLNESS: Patient is a pleasant 27 y.o. male who presents to urgent care today woke up this morning to a swollen eye lid, some drainage noted.  No changes to his vision noted.  Patient does wear contacts, he is currently wearing glasses.      Patient Active Problem List    Diagnosis Date Noted    Annual physical exam 2021    Pilonidal cyst 2021    Primary insomnia 2021    History of COVID-19 2021       Allergies:Shellfish-derived products and Tree nuts food allergy    No current [facility]-ordered outpatient medications on file.     No current Epic-ordered facility-administered medications on file.       No past medical history on file.    Social History     Tobacco Use    Smoking status: Never    Smokeless tobacco: Never   Vaping Use    Vaping Use: Never used   Substance Use Topics    Alcohol use: Yes     Comment: occ    Drug use: No       Family Status   Relation Name Status    Mo  Alive    Fa  Alive    MGMo       Family History   Problem Relation Age of Onset    Diabetes Mother     Diabetes Maternal Grandmother     Parkinson's Disease Maternal Grandmother        ROS:  Review of Systems   Constitutional: Negative for fever, chills, weight loss, malaise, and fatigue.   HENT: Negative for ear pain, nosebleeds, congestion, sore throat and neck pain.    Eyes: Negative for vision changes. Noted pain and swelling to the left upper eye lid   Neuro: Negative for headache, sensory changes, weakness, seizure, LOC.   Cardiovascular: Negative for chest pain, palpitations, orthopnea and leg swelling.   Respiratory: Negative for cough, sputum production, shortness of breath and wheezing.   Gastrointestinal: Negative for abdominal pain, nausea, vomiting or diarrhea.   Genitourinary: Negative for dysuria, urgency and frequency.  Musculoskeletal: Negative for falls, neck pain, back pain, joint  "pain, myalgias.   Skin: Negative for rash, diaphoresis.     Exam:  /88 (BP Location: Left arm, Patient Position: Sitting, BP Cuff Size: Adult)   Pulse 60   Temp 36.7 °C (98.1 °F) (Temporal)   Resp 14   Ht 1.727 m (5' 8\")   Wt 77.1 kg (170 lb)   SpO2 99%   General: well-nourished, well-developed male in NAD  Head: normocephalic, atraumatic  Eyes: PERRLA, no conjunctival injection, acuity grossly intact, lids normal, no redness to the left upper lid and swelling.    Ears: normal shape and symmetry, no tenderness, no discharge. External canals are without any significant edema or erythema. Tympanic membranes are without any inflammation, no effusion. Gross auditory acuity is intact.  Nose: symmetrical without tenderness, no discharge.  Mouth/Throat: reasonable hygiene, no erythema, exudates or tonsillar enlargement.  Neck: no masses, range of motion within normal limits, no tracheal deviation. No obvious thyroid enlargement.   Lymph: no cervical adenopathy. No supraclavicular adenopathy.   Neuro: alert and oriented. Cranial nerves 1-12 grossly intact. No sensory deficit.   Cardiovascular: regular rate and rhythm. No edema.  Pulmonary: no distress. Chest is symmetrical with respiration, no wheezes, crackles, or rhonchi.   Abdomen: soft, non-tender, no guarding, no hepatosplenomegaly.  Musculoskeletal: no clubbing, appropriate muscle tone, gait is stable.  Skin: warm, dry, intact, no clubbing, no cyanosis, no rashes.   Psych: appropriate mood, affect, judgement.         Assessment/Plan:    This is a pleasant 27-year-old male comes in today with complaints of left upper eyelid swelling and pain, that started this morning.  No noted changes to his vision.  He has had minimal drainage, that is clear.  He does wear contacts at times.  Today he is wearing glasses and he states that it has improved.  On assessment it appears that the patient has a hordeolum to the left upper eyelid, minimal redness and swelling is " noted.  No drainage from the eye.  Eye exam is otherwise within normal limits.  Patient was encouraged to use warm compresses, if the condition does not improve would consider antibiotic therapy however due to the course and the nature of the current condition I do not believe this is necessary at this time.  This was reviewed with the patient and he was okay with this.  Patient was advised to not wear his contacts for a few days, and if he develops any drainage from his eyes to please come back in for further assessment.    Supportive care, differential diagnoses, and indications for immediate follow-up discussed with patient.   Pathogenesis of diagnosis discussed including typical length and natural progression.   Instructed to return to clinic or nearest emergency department for any change in condition, further concerns, or worsening of symptoms.  Patient states understanding of the plan of care and discharge instructions.  Instructed to make an appointment, for follow up, with  primary care provider.        Please note that this dictation was created using voice recognition software. I have made every reasonable attempt to correct obvious errors, but I expect that there are errors of grammar and possibly content that I did not discover before finalizing the note.      Nany FRY